# Patient Record
Sex: FEMALE | Race: WHITE | Employment: FULL TIME | URBAN - METROPOLITAN AREA
[De-identification: names, ages, dates, MRNs, and addresses within clinical notes are randomized per-mention and may not be internally consistent; named-entity substitution may affect disease eponyms.]

---

## 2022-01-11 ENCOUNTER — OFFICE VISIT (OUTPATIENT)
Dept: ORTHOPEDIC SURGERY | Age: 57
End: 2022-01-11
Payer: COMMERCIAL

## 2022-01-11 VITALS
WEIGHT: 239 LBS | HEART RATE: 81 BPM | BODY MASS INDEX: 40.8 KG/M2 | HEIGHT: 64 IN | TEMPERATURE: 97.4 F | OXYGEN SATURATION: 95 %

## 2022-01-11 DIAGNOSIS — M70.62 TROCHANTERIC BURSITIS, LEFT HIP: Primary | ICD-10-CM

## 2022-01-11 PROCEDURE — 20610 DRAIN/INJ JOINT/BURSA W/O US: CPT | Performed by: PHYSICIAN ASSISTANT

## 2022-01-11 PROCEDURE — 99214 OFFICE O/P EST MOD 30 MIN: CPT | Performed by: PHYSICIAN ASSISTANT

## 2022-01-11 RX ORDER — SUMATRIPTAN 100 MG/1
TABLET, FILM COATED ORAL
COMMUNITY
Start: 2021-07-06

## 2022-01-11 RX ORDER — LEVOTHYROXINE SODIUM 0.07 MG/1
125 TABLET ORAL
COMMUNITY
Start: 2021-10-26

## 2022-01-11 RX ORDER — LEVOTHYROXINE, LIOTHYRONINE 19; 4.5 UG/1; UG/1
TABLET ORAL
COMMUNITY
Start: 2021-11-04 | End: 2022-06-22

## 2022-01-11 RX ORDER — TRIAMCINOLONE ACETONIDE 40 MG/ML
80 INJECTION, SUSPENSION INTRA-ARTICULAR; INTRAMUSCULAR ONCE
Status: COMPLETED | OUTPATIENT
Start: 2022-01-11 | End: 2022-01-11

## 2022-01-11 RX ORDER — PANTOPRAZOLE SODIUM 40 MG/1
TABLET, DELAYED RELEASE ORAL
COMMUNITY
Start: 2021-12-28

## 2022-01-11 RX ORDER — ASPIRIN 81 MG/1
81 TABLET ORAL DAILY
COMMUNITY

## 2022-01-11 RX ORDER — GABAPENTIN 300 MG/1
CAPSULE ORAL
COMMUNITY
Start: 2021-12-17

## 2022-01-11 RX ORDER — LIDOCAINE HYDROCHLORIDE 10 MG/ML
8 INJECTION, SOLUTION INFILTRATION; PERINEURAL ONCE
Status: COMPLETED | OUTPATIENT
Start: 2022-01-11 | End: 2022-01-11

## 2022-01-11 RX ORDER — MELOXICAM 15 MG/1
TABLET ORAL
COMMUNITY
Start: 2021-12-23

## 2022-01-11 RX ADMIN — LIDOCAINE HYDROCHLORIDE 8 ML: 10 INJECTION, SOLUTION INFILTRATION; PERINEURAL at 10:57

## 2022-01-11 RX ADMIN — TRIAMCINOLONE ACETONIDE 80 MG: 40 INJECTION, SUSPENSION INTRA-ARTICULAR; INTRAMUSCULAR at 10:51

## 2022-01-11 NOTE — PROGRESS NOTES
Patient's name, date of birth, and allergies have been confirmed. Patient is aware that injection is to be given in Left hip. He/she is aware that they will be seeing [facility] and the injection will be given by him. A timeout was performed immediately prior to the start of the injection procedure and included the correct patient (two identifiers), correct procedure and correct site(s). Procedure consent and allergies were also verified.

## 2022-01-11 NOTE — PROGRESS NOTES
How Severe Is This Condition?: mild Patient ID:  Tresa Vazquez is a 60 y.o. female who presents today for evaluation of left hip pain. Injury: no    Location of pain:  left lateral thigh  Pain: yes; 4 on a scale of 1 to 10  Onset: gradual  Duration: 2 years  Frequency:  occurs daily  Quality: aching, boring, soreness and stabbing   Swelling: patient does not note any swelling of the joint  Aggravating factors: weight bearing activity, standing and walking  Alleviating factors: removing weight from leg and rest  Mechanical symptoms: none  Radiation: no    Activities: walking independently  Restriction:  decreased ambulatory tolerance and no sports  Progression:  worsening    Previous treatment:  anti-inflammatories, steroid injection , physical therapy and weight loss  NSAIDs:  Gabapentin  PT:  Physical therapy, completed    Medications:    Current Outpatient Medications on File Prior to Visit   Medication Sig Dispense Refill    aspirin 81 MG EC tablet Take 81 mg by mouth daily      gabapentin (NEURONTIN) 300 MG capsule take 1 capsule by mouth three times a day      levothyroxine (SYNTHROID) 75 MCG tablet take 1 tablet by mouth every morning ON AN EMPTY STOMACH      meloxicam (MOBIC) 15 MG tablet take 1 tablet by mouth once daily      metFORMIN (GLUCOPHAGE) 500 MG tablet Take 1 tablet by mouth twice daily with meals.  pantoprazole (PROTONIX) 40 MG tablet take 1 tablet by mouth once daily      sertraline (ZOLOFT) 50 MG tablet take 1 tablet by mouth once daily      SUMAtriptan (IMITREX) 100 MG tablet take 1 tablet by mouth if needed      NP THYROID 30 MG tablet take 1 tablet by mouth once daily       No current facility-administered medications on file prior to visit. Allergies:     Allergies   Allergen Reactions    Black Cohosh     Charentais Melon (Gabonese Melon)     Short Ragweed Pollen Ext        Physical Exam:    Examination of the left hip    Gait:  normal  Trendelenberg sign:  negative  Swelling:  none  Erythema: none  Ecchymosis:  none  Extension:  full extension  Flexion:  110  Internal rotation: 30 degrees  External rotation:  30 degrees  Abduction:  30 degrees  Adduction:  30 degrees  Strength:  abduction 5 and flexion 5  Palpation:  tenderness over left greater trochanter  Log roll:  non-painful  Straight leg raise:  Negative  Neurovascular status:  sensation intact    Radiographs:  Radiographs of the left hip were personally reviewed, and they revealed:  no evidence of fracture      MRI: None    Diagnosis:     Diagnosis Orders   1. Trochanteric bursitis, left hip  [place] ARTHROCENTESIS ASPIR&/INJ MAJOR JT/BURSA W/O US       Plan:  Time Out  [x] Patient Verified  [x] Site Verified  [x] Laterality Verified  [x] Procedure Verified    Before aspiration/injection risks/benefits of a cortisone injection including infection, local skin irritation, skin atrophy, continued pain/discomfort, elevated blood sugar, burning, failure to relieve pain, possible late infection were discussed with patient. Patient verbalized understanding and wanted to proceed with planned procedure. After prepping and draping the left hip in the usual sterile fashion, 2 cc of 40 mg/ml kenalog with 8 cc of 1% lidocaine were injected into the left trochanteric bursa without any complications. Patient tolerated this well. Post-procedure discomfort can be alleviated with additional medications/ice/elevation/rest over the first 24 hours as recommended. The patient tolerated the procedure well. If fluid was aspirated it was sent for further studies  in sterile specimen container as ordered to the lab    Explained to the patient she has recurrent trochanteric bursitis. Injected her trochanteric bursa today with cortisone. We discussed IT band stretches. We discussed a foam roller. We discussed topical anti-inflammatory medication. She would like me to address the right trochanteric bursa in the upcoming weeks.     Orders Placed This Encounter Procedures    [place] ARTHROCENTESIS ASPIR&/INJ MAJOR JT/BURSA W/O US       Orders Placed This Encounter   Medications    lidocaine 1 % injection 8 mL    triamcinolone acetonide (KENALOG-40) injection 80 mg       Return if symptoms worsen or fail to improve.     GARY Carr

## 2022-01-25 ENCOUNTER — OFFICE VISIT (OUTPATIENT)
Dept: ORTHOPEDIC SURGERY | Age: 57
End: 2022-01-25
Payer: COMMERCIAL

## 2022-01-25 VITALS — OXYGEN SATURATION: 98 % | HEART RATE: 66 BPM | TEMPERATURE: 97 F

## 2022-01-25 DIAGNOSIS — M70.62 TROCHANTERIC BURSITIS, LEFT HIP: Primary | ICD-10-CM

## 2022-01-25 PROCEDURE — 99213 OFFICE O/P EST LOW 20 MIN: CPT | Performed by: PHYSICIAN ASSISTANT

## 2022-01-25 ASSESSMENT — ENCOUNTER SYMPTOMS
EYES NEGATIVE: 1
RESPIRATORY NEGATIVE: 1
GASTROINTESTINAL NEGATIVE: 1

## 2022-01-25 NOTE — PROGRESS NOTES
Lurdes Higuera (:  1965) is a 60 y.o. female,Established patient, here for evaluation of the following chief complaint(s):  Follow-up (The patient returns for bilateral hip pain. She states that her left is worse than her righ hip. She states that she can feel a pulse sensation when driving. Her pain is rated as a 3/10 today. She had a cortisone injection of her left hip which she states has helped. )         ASSESSMENT/PLAN:  1. Trochanteric bursitis, left hip      No follow-ups on file. Subjective   SUBJECTIVE/OBJECTIVE:  Patient states her left trochanteric bursa is doing much better. States the right trochanteric bursa was more painful previously it is doing better today. She does have some left buttocks pain. She denies change in sensation of the leg. States the pain can radiate from the buttocks down to just above the knee. She denies any bowel or bladder dysfunction. Review of Systems   Constitutional: Negative. HENT: Negative. Eyes: Negative. Respiratory: Negative. Gastrointestinal: Negative. Genitourinary: Negative. Musculoskeletal: Negative. Psychiatric/Behavioral: Negative. Objective   Physical Exam  Musculoskeletal:      Comments: Left hip-no groin pain with internal or external rotation of the left leg. Trochanteric bursa is nontender. Tenderness with palpation to left buttocks. Pain increases with straight leg raise on the left. No leg pain with straight leg raise. Sensation is intact distally to light touch. Lower back-no tenderness with palpation to left L3-L5 paraspinal region. Flexion is 80 degrees, extension is 15 degrees. Heel-to-toe ambulation is intact. Explained to the patient she does have trochanteric bursitis. Trochanteric bursitis improved with cortisone injection. We have discussed injecting her right trochanteric bursa today but she does not believe she needs that. I discussed stretching exercises.   She has a daughter who is a physical therapist.  She will begin some core stretching exercises as well as stretching her piriformis. She is to follow-up with me as her symptoms dictate. She will continue taking her meloxicam.       On this date 1/25/2022 I have spent 20 minutes reviewing previous notes, test results and face to face with the patient discussing the diagnosis and importance of compliance with the treatment plan as well as documenting on the day of the visit. An electronic signature was used to authenticate this note.     --GARY Bhatt

## 2022-06-22 ENCOUNTER — PROCEDURE VISIT (OUTPATIENT)
Dept: ORTHOPEDIC SURGERY | Age: 57
End: 2022-06-22
Payer: OTHER GOVERNMENT

## 2022-06-22 VITALS — HEIGHT: 64 IN | WEIGHT: 237 LBS | BODY MASS INDEX: 40.46 KG/M2 | TEMPERATURE: 97.5 F

## 2022-06-22 DIAGNOSIS — M70.62 TROCHANTERIC BURSITIS, LEFT HIP: Primary | ICD-10-CM

## 2022-06-22 PROCEDURE — 99999 PR OFFICE/OUTPT VISIT,PROCEDURE ONLY: CPT | Performed by: PHYSICIAN ASSISTANT

## 2022-06-22 PROCEDURE — 20610 DRAIN/INJ JOINT/BURSA W/O US: CPT | Performed by: PHYSICIAN ASSISTANT

## 2022-06-22 RX ORDER — LIDOCAINE HYDROCHLORIDE 10 MG/ML
8 INJECTION, SOLUTION INFILTRATION; PERINEURAL ONCE
Status: COMPLETED | OUTPATIENT
Start: 2022-06-22 | End: 2022-06-22

## 2022-06-22 RX ORDER — VENLAFAXINE HYDROCHLORIDE 75 MG/1
75 CAPSULE, EXTENDED RELEASE ORAL DAILY
COMMUNITY
Start: 2022-03-31

## 2022-06-22 RX ORDER — TRIAMCINOLONE ACETONIDE 40 MG/ML
80 INJECTION, SUSPENSION INTRA-ARTICULAR; INTRAMUSCULAR ONCE
Status: COMPLETED | OUTPATIENT
Start: 2022-06-22 | End: 2022-06-22

## 2022-06-22 RX ADMIN — LIDOCAINE HYDROCHLORIDE 8 ML: 10 INJECTION, SOLUTION INFILTRATION; PERINEURAL at 10:26

## 2022-06-22 RX ADMIN — TRIAMCINOLONE ACETONIDE 80 MG: 40 INJECTION, SUSPENSION INTRA-ARTICULAR; INTRAMUSCULAR at 10:27

## 2022-06-22 ASSESSMENT — ENCOUNTER SYMPTOMS
GASTROINTESTINAL NEGATIVE: 1
RESPIRATORY NEGATIVE: 1
EYES NEGATIVE: 1

## 2022-06-22 NOTE — PATIENT INSTRUCTIONS
Patient Education        Learning About a Hip Bursa Injection  What is a hip bursa injection? A bursa is a small sac of fluid that cushions an area between tendons and bones. The bursa on the outer side of the hip bone is called the trochanteric (say \"[id number]\") bursa. Sometimes it can become swollen and painful. This condition is called bursitis. An injection into the bursa is a shot of medicine to reduce pain and swelling. The medicines may include pain relievers and steroid medicines. A steroid shot can sometimes help with short-term pain relief when other treatments haven'tworked. How is a hip bursa injection done? First the area over the bursa will be cleaned. Your doctor may use a tinyneedle to numb the skin over the area where you will get the injection. If a tiny needle is used to numb the area, your doctor will use another needle to inject the medicine. Your doctor may use a pain reliever, a steroid, orboth. You may feel some pressure or discomfort. Your doctor may put ice on the area before you go home. What can you expect after the injection? You will probably go home soon after your shot. You may have numbness over yourhip for a few hours. If your shot included both a pain reliever and a steroid, the pain will probably go away right away. But it might come back after a few hours. This might happen if the pain reliever wears off and the steroid has not started towork yet. The steroid medicine generally takes a few days to work. If the pain comes back, you can put ice or a cold pack on your hip for 10 to 20minutes at a time. Put a thin cloth between the ice and your skin. Follow your doctor's instructions carefully. Avoid strenuous activities on theday you get the shot, especially those that put stress on your hip. Steroids don't always work. But when they do, the pain relief can last forseveral days to a few months or longer.   Follow-up care is a key part of your treatment and safety. Be sure to make and go to all appointments, and call your doctor if you are having problems. It's also a good idea to know your test results and keep alist of the medicines you take. Where can you learn more? Go to [email or web address] and sign in to your [facility] account. Enter D891 in the [employer] box to learn more about \"Learning About a Hip Bursa Injection. \"     If you do not have an account, please click on the \"Sign Up Now\" link. Current as of: March 9, 2022               Content Version: 13.3  © 4886-6240 [employer]. Care instructions adapted under license by [facility] ([facility]). If you have questions about a medical condition or this instruction, always ask your healthcare professional. [address] any warranty or liability for your use of this information.

## 2022-06-22 NOTE — PROGRESS NOTES
Tatiana Chaidez (:  1965) is a 60 y.o. female,Established patient, here for evaluation of the following chief complaint(s):  Follow-up (Trochanteric bursitis, left hip. Pt states her hip has been keeping her up at night she says when long time sitting & laying down cause pain, she says the pain hurts no matter what side she lays on. )         ASSESSMENT/PLAN:  1. Trochanteric bursitis, left hip  -     [place] ARTHROCENTESIS ASPIR&/INJ MAJOR JT/BURSA W/O US      No follow-ups on file. Subjective   SUBJECTIVE/OBJECTIVE:  This is a 59-year-old female with complaint of left lateral hip pain. She has known trochanteric bursitis. Most recent cortisone injection for trochanteric bursitis was 7 months prior. She presents today requesting cortisone injection in the left trochanteric bursa. She has been going to yoga and exercise classes. Review of Systems   Constitutional: Negative. HENT: Negative. Eyes: Negative. Respiratory: Negative. Gastrointestinal: Negative. Genitourinary: Negative. Musculoskeletal: Negative. Psychiatric/Behavioral: Negative. Objective   Physical Exam  Musculoskeletal:      Comments: Left hip-no groin pain with internal or external rotation of the left leg. Tenderness with palpation directly over the trochanteric bursa. No snapping hip. Pain with resisted abduction of the left leg. IT band is taunt. Sensation is intact distally to light touch. Time Out  [x] Patient Verified  [x] Site Verified  [x] Laterality Verified  [x] Procedure Verified    Before aspiration/injection risks/benefits of a cortisone injection including infection, local skin irritation, skin atrophy, continued pain/discomfort, elevated blood sugar, burning, failure to relieve pain, possible late infection were discussed with patient. Patient verbalized understanding and wanted to proceed with planned procedure.     After prepping and draping the left hip in the usual sterile fashion, 2 cc of 40 mg/ml kenalog with 8 cc of 1% lidocaine were injected into the left trochanteric bursa without any complications. Patient tolerated this well. Post-procedure discomfort can be alleviated with additional medications/ice/elevation/rest over the first 24 hours as recommended. The patient tolerated the procedure well. If fluid was aspirated it was sent for further studies  in sterile specimen container as ordered to the lab     Explained to the patient she does have trochanteric bursitis. Injected the left trochanteric bursa with cortisone today. We discussed topical anti-inflammatory creams. We discussed range of motion exercises as well as stretching. We discussed the duration of time between injections. She will follow-up with me as her symptoms dictate. On this date 6/22/2022 I have spent 33 minutes reviewing previous notes, test results and face to face with the patient discussing the diagnosis and importance of compliance with the treatment plan as well as documenting on the day of the visit. An electronic signature was used to authenticate this note.     --GARY Norris

## 2022-06-22 NOTE — PROGRESS NOTES
Patient's name, date of birth, and allergies have been confirmed. Patient is aware that injection is to be given in Left hip. He[facility]she is [facility] and the injection will be given by him.

## 2023-02-07 ENCOUNTER — OFFICE VISIT (OUTPATIENT)
Dept: ORTHOPEDIC SURGERY | Age: 58
End: 2023-02-07

## 2023-02-07 VITALS
OXYGEN SATURATION: 96 % | BODY MASS INDEX: 41.99 KG/M2 | HEIGHT: 63 IN | TEMPERATURE: 97.4 F | HEART RATE: 77 BPM | WEIGHT: 237 LBS

## 2023-02-07 DIAGNOSIS — M25.522 ELBOW PAIN, LEFT: ICD-10-CM

## 2023-02-07 DIAGNOSIS — M75.51 ACUTE BURSITIS OF RIGHT SHOULDER: Primary | ICD-10-CM

## 2023-02-07 RX ORDER — LEVOTHYROXINE SODIUM 0.12 MG/1
125 TABLET ORAL ONCE
COMMUNITY
Start: 2023-01-13

## 2023-02-07 RX ORDER — TRIAMCINOLONE ACETONIDE 40 MG/ML
80 INJECTION, SUSPENSION INTRA-ARTICULAR; INTRAMUSCULAR ONCE
Status: COMPLETED | OUTPATIENT
Start: 2023-02-07 | End: 2023-02-07

## 2023-02-07 RX ORDER — LOSARTAN POTASSIUM 50 MG/1
50 TABLET ORAL DAILY
COMMUNITY
Start: 2022-11-17

## 2023-02-07 RX ORDER — LIDOCAINE HYDROCHLORIDE 10 MG/ML
8 INJECTION, SOLUTION INFILTRATION; PERINEURAL ONCE
Status: COMPLETED | OUTPATIENT
Start: 2023-02-07 | End: 2023-02-07

## 2023-02-07 RX ORDER — PHENTERMINE HYDROCHLORIDE 37.5 MG/1
37.5 TABLET ORAL DAILY
COMMUNITY
Start: 2023-01-27 | End: 2023-02-26

## 2023-02-07 RX ADMIN — TRIAMCINOLONE ACETONIDE 80 MG: 40 INJECTION, SUSPENSION INTRA-ARTICULAR; INTRAMUSCULAR at 11:24

## 2023-02-07 RX ADMIN — LIDOCAINE HYDROCHLORIDE 8 ML: 10 INJECTION, SOLUTION INFILTRATION; PERINEURAL at 11:31

## 2023-02-07 ASSESSMENT — ENCOUNTER SYMPTOMS
EYES NEGATIVE: 1
GASTROINTESTINAL NEGATIVE: 1
RESPIRATORY NEGATIVE: 1

## 2023-02-07 NOTE — PROGRESS NOTES
Sonu Jo (:  1965) is a 59 y.o. female,Established patient, here for evaluation of the following chief complaint(s):  New Patient (Pt presents today for a new issue. She is having right shoulder pain. This pain started about 2 week ago. She states there was no injury. The pain started on the anterior aspect of the right shoulder girdle. The pain is radiating down her arm. In the past she had rotator cuff issues to this shoulder and has had cortisone injections. She played sports a lot and has arthritis in this shoulder. She is having a hard time sleeping. Pt takes motrin acetaminophen 500 mg for pain. Pain scale: 7/10 )         ASSESSMENT/PLAN:  1. Acute bursitis of right shoulder  -     XR SHOULDER RIGHT (MIN 2 VIEWS); Future  -     [place] ARTHROCENTESIS ASPIR&/INJ MAJOR JT/BURSA W/O US  2. Elbow pain, left  -     XR ELBOW LEFT (MIN 3 VIEWS); Future    Return in about 2 weeks (around 2023). Subjective   SUBJECTIVE/OBJECTIVE:  This is a 28-year-old right-hand-dominant female complaining of right shoulder and left elbow pain. She has been scrapbooking. She states that she developed right shoulder pain after sitting hunched over scrapbook for hours at a time. She cannot recall any specific injury. She does states she has had cortisone injections in the shoulder previously. She denies change in sensation of the right arm. She has clicking in the left elbow. She denies any injury. She denies any locking of the elbow. States she does have known osteoarthritis of multiple joints. Review of Systems   Constitutional: Negative. HENT: Negative. Eyes: Negative. Respiratory: Negative. Gastrointestinal: Negative. Genitourinary: Negative. Musculoskeletal: Negative. Psychiatric/Behavioral: Negative. Objective     X-rays right shoulder show no acute fracture or dislocation. Widening of the acromial humeral joint space. Glenohumeral joint spaces maintained. X-rays of the left elbow show no acute fracture or dislocation. Joint spaces maintained. Physical Exam  Constitutional:       Appearance: Normal appearance. HENT:      Head: Normocephalic and atraumatic. Mouth/Throat:      Mouth: Mucous membranes are moist.   Eyes:      Extraocular Movements: Extraocular movements intact. Musculoskeletal:      Cervical back: Normal range of motion. Comments: Right shoulder-no acromioclavicular, clavicle, SC joint tenderness with palpation. Prominent acromioclavicular joint. Internal rotation is 0 degrees, external rotation is symmetrical and 130 degrees. Supraspinatus test elicits mild pain but no specific weakness. Liftoff is negative. Apprehension sign is negative. Impingement signs of Neer's and Medel are very mildly positive. Biceps contour is normal.  Sensation is intact distally to light touch. Left elbow-full extension, flexion is 130 degrees. Elbow joint is stable, there is no laxity with valgus or varus stress. No crepitus with range of motion. Tenderness with palpation directly over the lateral epicondyle. Medial epicondyle and olecranon are nontender. Notable pain increases with resisted extension of the left wrist.  Elbow pain increases with resisted pronation of the left wrist.  Sensation is intact distally to light touch. Skin:     General: Skin is warm and dry. Neurological:      General: No focal deficit present. Mental Status: She is oriented to person, place, and time. Psychiatric:         Mood and Affect: Mood normal.        Shoulder Injection Procedure Note    Pre-operative Diagnosis:    Diagnosis Orders   1. Acute bursitis of right shoulder  XR SHOULDER RIGHT (MIN 2 VIEWS)    [place] ARTHROCENTESIS ASPIR&/INJ MAJOR JT/BURSA W/O US      2. Elbow pain, left  XR ELBOW LEFT (MIN 3 VIEWS)           Post-operative Diagnosis:    Diagnosis Orders   1.  Acute bursitis of right shoulder  XR SHOULDER RIGHT (MIN 2 VIEWS)    [place] ARTHROCENTESIS ASPIR&/INJ MAJOR JT/BURSA W/O US      2. Elbow pain, left  XR ELBOW LEFT (MIN 3 VIEWS)           Indications: tendonitis    Anesthesia: Ethyl Chloride    Procedure Details     Verbal consent was obtained for the procedure. The right shoulder was prepped with iodine and the skin was anesthetized. Using a 22 gauge needle the  is injected with 8 mL 1% lidocaine and 2 mL of triamcinolone (KENALOG) 40mg/ml under the posterior aspect of the acromion. The injection site was cleansed with topical isopropyl alcohol and a dressing was applied. Complications:  None; patient tolerated the procedure well. Explained to the patient she does have rotator cuff tendinitis as well as bursitis. Injected the shoulder today with cortisone. She has lateral epicondylitis of the left elbow. She will begin wearing a tennis elbow strap. We discussed stretching exercises. We discussed ice massages. She is to follow-up with me in 2 weeks we may need to proceed with injection in the left elbow. On this date 2/7/2023 I have spent 35 minutes reviewing previous notes, test results and face to face with the patient discussing the diagnosis and importance of compliance with the treatment plan as well as documenting on the day of the visit. An electronic signature was used to authenticate this note.     --GARY Hadley

## 2023-05-12 ENCOUNTER — OFFICE VISIT (OUTPATIENT)
Dept: ORTHOPEDIC SURGERY | Age: 58
End: 2023-05-12

## 2023-05-12 VITALS
BODY MASS INDEX: 41.64 KG/M2 | WEIGHT: 235 LBS | HEART RATE: 97 BPM | TEMPERATURE: 97.1 F | HEIGHT: 63 IN | OXYGEN SATURATION: 96 %

## 2023-05-12 DIAGNOSIS — M77.12 LATERAL EPICONDYLITIS, LEFT ELBOW: Primary | ICD-10-CM

## 2023-05-12 RX ORDER — LIDOCAINE HYDROCHLORIDE 10 MG/ML
1 INJECTION, SOLUTION INFILTRATION; PERINEURAL ONCE
Status: COMPLETED | OUTPATIENT
Start: 2023-05-12 | End: 2023-05-12

## 2023-05-12 RX ORDER — TRIAMCINOLONE ACETONIDE 40 MG/ML
40 INJECTION, SUSPENSION INTRA-ARTICULAR; INTRAMUSCULAR ONCE
Status: COMPLETED | OUTPATIENT
Start: 2023-05-12 | End: 2023-05-12

## 2023-05-12 RX ADMIN — TRIAMCINOLONE ACETONIDE 40 MG: 40 INJECTION, SUSPENSION INTRA-ARTICULAR; INTRAMUSCULAR at 15:57

## 2023-05-12 RX ADMIN — LIDOCAINE HYDROCHLORIDE 1 ML: 10 INJECTION, SOLUTION INFILTRATION; PERINEURAL at 15:55

## 2023-05-12 NOTE — PROGRESS NOTES
Procedure-after sterile prep under aseptic technique 1 cc of 1% lidocaine and 1 cc of 40 mg/mL Kenalog are injected into the common extensor tendon of the left elbow. Patient tolerated procedure well.